# Patient Record
Sex: MALE | Race: WHITE
[De-identification: names, ages, dates, MRNs, and addresses within clinical notes are randomized per-mention and may not be internally consistent; named-entity substitution may affect disease eponyms.]

---

## 2017-09-20 ENCOUNTER — HOSPITAL ENCOUNTER (EMERGENCY)
Dept: HOSPITAL 25 - UCCORT | Age: 32
Discharge: HOME | End: 2017-09-20
Payer: COMMERCIAL

## 2017-09-20 VITALS — DIASTOLIC BLOOD PRESSURE: 90 MMHG | SYSTOLIC BLOOD PRESSURE: 147 MMHG

## 2017-09-20 DIAGNOSIS — F17.210: ICD-10-CM
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
## 2017-09-20 DIAGNOSIS — S93.401A: Primary | ICD-10-CM
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
## 2017-09-20 PROCEDURE — 99211 OFF/OP EST MAY X REQ PHY/QHP: CPT

## 2017-09-20 PROCEDURE — G0463 HOSPITAL OUTPT CLINIC VISIT: HCPCS

## 2017-09-20 NOTE — RAD
Indication: Right ankle pain.



3 views of the right ankle demonstrates soft tissue swelling laterally. Ankle mortise is

intact. No fracture is noted.



IMPRESSION: Soft tissue swelling laterally without evidence of fracture.

## 2017-09-20 NOTE — UC
Lower Extremity/Ankle HPI





- HPI Summary


HPI Summary: 





30 yo male twisted right ankle 2 days ago


swollen and ecchymotic


able to bear wt with limp





remote hx fx











- History of Current Complaint


Chief Complaint: UCLowerExtremity


Stated Complaint: TWISTED RIGHT ANKLE


Time Seen by Provider: 09/20/17 15:17


Hx Obtained From: Patient


Onset/Duration: Sudden Onset


Severity Initially: Moderate


Severity Currently: Mild


Pain Intensity: 4


Pain Scale Used: 0-10 Numeric


Aggravating Factor(s): Standing


Alleviating Factor(s): Rest, Elevation


Able to Bear Weight: Yes


Feet (Multiple View): 


  __________________________














  __________________________





 1 - tender/swollen/ecchymotic








- Allergies/Home Medications


Allergies/Adverse Reactions: 


 Allergies











Allergy/AdvReac Type Severity Reaction Status Date / Time


 


No Known Allergies Allergy   Verified 09/20/17 14:55











Home Medications: 


 Home Medications





NK [No Home Medications Reported]  09/20/17 [History Confirmed 09/20/17]











PMH/Surg Hx/FS Hx/Imm Hx


Previously Healthy: Yes





- Surgical History


Surgical History: Yes


Surgery Procedure, Year, and Place: APPENDECTOMY, Left foot surgery 3/14





- Family History


Known Family History: Positive: None


   Negative: Cardiac Disease, Hypertension, Diabetes





- Social History


Alcohol Use: Occasionally


Substance Use Type: None


Smoking Status (MU): Heavy Every Day Tobacco Smoker


Type: Cigarettes


Amount Used/How Often: 1/2 ppd


Length of Time of Smoking/Using Tobacco: 10 years


Have You Smoked in the Last Year: Yes


Household Exposure Type: Cigarettes





- Immunization History


Most Recent Influenza Vaccination: no





Review of Systems


Constitutional: Negative


Skin: Bruising


Eyes: Negative


ENT: Negative


Respiratory: Negative


Cardiovascular: Negative


Gastrointestinal: Negative


Genitourinary: Negative


Motor: Negative


Neurovascular: Negative


Musculoskeletal: Arthralgia


Neurological: Negative


Psychological: Negative


Is Patient Immunocompromised?: No


All Other Systems Reviewed And Are Negative: Yes





Physical Exam


Triage Information Reviewed: Yes


Appearance: Well-Appearing, No Pain Distress, Well-Nourished


Vital Signs: 


 Initial Vital Signs











Temp  97.9 F   09/20/17 14:52


 


Pulse  89   09/20/17 14:52


 


Resp  16   09/20/17 14:52


 


BP  147/90   09/20/17 14:52


 


Pulse Ox  98   09/20/17 14:52











Vital Signs Reviewed: Yes


Eyes: Positive: Conjunctiva Clear


ENT: Positive: Hearing grossly normal.  Negative: Nasal congestion, Nasal 

drainage, Tonsillar swelling, Tonsillar exudate, Muffled/hoarse voice


Neck: Positive: Supple, Nontender


Respiratory: Positive: Lungs clear, Normal breath sounds, No respiratory 

distress


Cardiovascular: Positive: RRR, No Murmur


Musculoskeletal: Positive: Other: - see image


Neurological: Positive: Alert


Psychological Exam: Normal


Skin Exam: Normal





Diagnostics





- Radiology


  ** No standard instances


Xray Interpretation: No Acute Changes - STS AND ROUNDED CALCIFATION AT BASE OF 

FIBULA


Radiology Interpretation Completed By: ED Physician





Lower Extremity Course/Dx





- Differential Dx/Diagnosis


Provider Diagnoses: ANKEL SPRAIN





Discharge





- Discharge Plan


Condition: Stable


Disposition: HOME


Patient Education Materials:  Ankle Sprain (ED)


Referrals: 


Vince Carr MD [Medical Doctor] - 1 Week (IF NOT BETTER)


SIVA Mccarthy [Primary Care Provider] - 2 Weeks (BP SHOULD GET RECHECKED 

SOMETIME THIS FALL)


Additional Instructions: 


ICE TWICE DAILY


ADVIL OR ALEVE IF NEEDED





RECHECK IN ONE WEEK IF NOT COMPLETELY BETTER





WEAR YOUR WALKING BOOT

## 2018-08-18 ENCOUNTER — HOSPITAL ENCOUNTER (EMERGENCY)
Dept: HOSPITAL 25 - UCCORT | Age: 33
Discharge: HOME | End: 2018-08-18
Payer: COMMERCIAL

## 2018-08-18 VITALS — SYSTOLIC BLOOD PRESSURE: 124 MMHG | DIASTOLIC BLOOD PRESSURE: 97 MMHG
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
## 2018-08-18 DIAGNOSIS — M51.37: ICD-10-CM

## 2018-08-18 DIAGNOSIS — F17.210: ICD-10-CM

## 2018-08-18 DIAGNOSIS — S39.012A: Primary | ICD-10-CM
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
## 2018-08-18 DIAGNOSIS — R03.0: ICD-10-CM

## 2018-08-18 PROCEDURE — G0463 HOSPITAL OUTPT CLINIC VISIT: HCPCS

## 2018-08-18 PROCEDURE — 96372 THER/PROPH/DIAG INJ SC/IM: CPT

## 2018-08-18 PROCEDURE — 72110 X-RAY EXAM L-2 SPINE 4/>VWS: CPT

## 2018-08-18 PROCEDURE — 99212 OFFICE O/P EST SF 10 MIN: CPT

## 2018-08-18 NOTE — RAD
HISTORY: acute lower back pain s/p heavy lifting



COMPARISONS: None



VIEWS: 5 , Frontal, lateral, coned-down lateral sacral, and bilateral oblique views of the

lumbar spine.



FINDINGS: 



ALIGNMENT:  The alignment is normal.

VERTEBRAL BODIES:  The vertebral body heights are normal. The interpedicular distances are

normal. There is minimal anterolateral marginal osteophyte formation.

JOINTS:  The facet joints are normal. 

INTERVERTEBRAL DISCS:  There is mild diffuse loss of intervertebral disc height.

SOFT TISSUE: Unremarkable.



OTHER:  The pelvis is unremarkable. The lung bases are clear.



IMPRESSION: 

MILD DEGENERATIVE DISC DISEASE.

## 2018-08-18 NOTE — UC
Back Pain HPI





- HPI Summary


HPI Summary: 





33 y/o male presents to the urgent care c/o lower back pain radiating to left 

hip and left thigh s/p lifting a tire in to his truck yesterday afternoon. Pt 

states pain is 8/10 sharp and spasmodic w/ bending sitting or laying down. Pt 

has taken Ibuprofen PO 600mg PO last night to alleviate symptoms w/o any 

improvement. Pt denies numbness or tingling sensation over the left leg, saddle 

anesthesia, urinary or fecal incontinence, urinary symptoms, fever. flank pain, 

abdominal pain, SOB, chest pain, N/V/D. No Hx of back pain.  








- History of Current Complaint


Chief Complaint: UCBackPain


Stated Complaint: LOWER BACK/BILATERAL HIP PAIN


Time Seen by Provider: 08/18/18 13:22


Hx Obtained From: Patient


Onset/Duration: Sudden Onset, Lasting Days - 1 day, Still Present, Worse Since 

- this morning


Timing: Constant


Severity Initially: Moderate


Severity Currently: Moderate


Pain Intensity: 8


Pain Scale Used: 0-10 Numeric


Back Pain: Is Discrete @ - lower back pain, Radiates To - left side of back and 

left hip


Character: Sharp, Spasmodic


Aggravating Factor(s): Lifting, Bending, Other - sitting


Alleviating Factor(s): Rest, OTC Meds - Ibuprofen PO 600mg


Associated Signs And Symptoms: Positive: Negative.  Negative: Swelling, Redness

, Bruising, Fever, Weakness, Numbness, Tingling, Abdominal Pain, Flank Pain, 

Bladder Incontinence, Bowel Incontinence, Weight Loss, Pain with Weight Bearing





- Risk Factors


AAA Risk Factors: Negative


TAD Risk Factors: Negative


Cauda Equina Risk Factors: Negative


Epidural Abscess Risk Factors: Negative





- Allergies/Home Medications


Allergies/Adverse Reactions: 


 Allergies











Allergy/AdvReac Type Severity Reaction Status Date / Time


 


No Known Allergies Allergy   Verified 08/18/18 13:12














PMH/Surg Hx/FS Hx/Imm Hx


Previously Healthy: Yes - Pt denies PMHX





- Surgical History


Surgical History: Yes


Surgery Procedure, Year, and Place: APPENDECTOMY, Left foot surgery 3/14





- Family History


Known Family History: Positive: None - Pt denies FMHX


   Negative: Cardiac Disease, Hypertension, Diabetes





- Social History


Occupation: Employed Full-time


Lives: With Family


Alcohol Use: Weekly


Substance Use Type: None


Smoking Status (MU): Heavy Every Day Tobacco Smoker


Type: Cigarettes


Amount Used/How Often: 1/2 ppd


Length of Time of Smoking/Using Tobacco: 10 years


Have You Smoked in the Last Year: Yes


Household Exposure Type: Cigarettes





- Immunization History


Most Recent Influenza Vaccination: no





Review of Systems


Constitutional: Negative


Skin: Negative


Eyes: Negative


ENT: Negative


Respiratory: Negative


Cardiovascular: Negative


Gastrointestinal: Negative


Genitourinary: Negative


Motor: Negative


Neurovascular: Negative


Musculoskeletal: Decreased ROM - lower back, Other: - lower back pain radiating 

to left hip s/p heavy lifting


Neurological: Negative


Psychological: Negative


Is Patient Immunocompromised?: No


All Other Systems Reviewed And Are Negative: Yes





Physical Exam





- Summary


Physical Exam Summary: 





Vital Signs Reviewed: Yes


Appearance: Well-Appearing, Well-Nourished, obese male standing since he states 

pain is worse by sitting w/o any apparent distress. 


Eyes: Positive: Conjunctiva Clear - PERRLA, EOMI.


ENT: Positive: Normal ENT inspection, Hearing grossly normal, Pharynx normal, 

TMs normal, Uvula midline


Neck: Positive: Supple, Nontender, No Lymphadenopathy


Respiratory: Positive: Chest non-tender, Lungs clear, Normal breath sounds, No 

respiratory distress


Cardiovascular: Positive: RRR, No Murmur, Pulses Normal, Brisk Capillary Refill


Abdomen Description: Positive: Nontender, No Organomegaly, Soft. Negative: CVA 

Tenderness (R), CVA Tenderness (L)


Bowel Sounds: Positive: Present


Musculoskeletal: Positive: Strength Intact,  BACK: Patient walked into the 

urgent care room with symmetric ambulation, No signs of limping, antalgic, able 

to bear weight. No signs of trauma, No masses palpated. Point tenderness at the 

level of L5-S1 w/ left side paraspinal muscle tenderness at the same level and 

left hip, No CVAT, no flank ecchymosis . No sacroiliac notch tenderness, No 

saddle anesthesia.ROM: limited due to pain, Straight Leg Raise: unable to 

performe sicne Pt declines laying of the bed. Patellar reflexes: brisk, 

symmetric Muscle strength lower extremities. Dorsiflexion/ plantar flexion of 

ankles. Heel/ toe walk. Lower extremities: Femoral, popliteal, posterior tibial

, and dorsalis pedis pulses WNL. Pt refuse rectal exam


Neurological: Positive: Alert, Muscle Tone Normal


Psychological Exam: Normal


Skin Exam: Normal








Triage Information Reviewed: Yes


Vital Signs: 


 Initial Vital Signs











Temp  97.6 F   08/18/18 13:13


 


Pulse  84   08/18/18 13:13


 


Resp  17   08/18/18 13:13


 


BP  124/97   08/18/18 13:13


 


Pulse Ox  98   08/18/18 13:13














Back Pain Course/Dx





- Course


Course Of Treatment: 33 y/o male presents to the urgent care c/o lower back 

pain radiating to left hip and left thigh s/p lifting a tire in to his truck 

yesterday afternoon. Pt states pain is 8/10 sharp and spasmodic w/ bending 

sitting or laying down. Pt has taken Ibuprofen PO 600mg PO last night to 

alleviate symptoms w/o any improvement. Pt denies numbness or tingling 

sensation over the left leg, saddle anesthesia, urinary or fecal incontinence, 

urinary symptoms, fever. flank pain, abdominal pain, SOB, chest pain, N/V/D. No 

Hx of back pain. Hx obtained. PE: Point tenderness at the level of the L5-S1 

and left paraspinal muscle spasm at the same level on examination.  Lumbosacral 

X-ray ordered, Impression: Mild degenerative disc disease. Toradol IM inj 

ordered at the clinic. Given by nurse. Pt tolerated well IM inj and  pain 

decrease. Pt Rx Medrol dose johanna,  Ibuprofen PO, flexeril PO to alelviate 

symptoms.  Patient was instructed to  the f/u wit orthopedic in 1 week if 

symptoms do not improve or worsen. Patient understands and agrees. Patient is 

able to ambulate freely w/o aid or limp.  Pt's BP is elevated today advised to 

decrease salt in diet, monitor BP and f/u with PCP for further management.  

Plan of care was discussed with the patient and patient understands and agrees. 

All questions were answered at patient satisfaction.  Pt left clinic 

hemodynamically stable.





- Differential Dx/Diagnosis


Differential Diagnosis/HQI/PQRI: Compressive Cord Syndrome, Fracture, Herniated 

Disc, Renal Colic, Strain, Sprain


Provider Diagnoses: 1- Acute lower back pain s/p heavy lifting.  2- Acute lower 

back strain.  3- Degenerative disc disease.  2- Elevated BP w/o Hx of HTN





Discharge





- Sign-Out/Discharge


Documenting (check all that apply): Patient Departure - D/c home





- Discharge Plan


Condition: Stable


Disposition: HOME


Prescriptions: 


Cyclobenzaprine TAB* [Flexeril 10 MG TAB*] 10 mg PO TID PRN #21 tab


 PRN Reason: Spasms - Back


Ibuprofen TAB* [Motrin TAB* 800 MG] 800 mg PO Q6H PRN #30 tab


 PRN Reason: back pain


methylPREDNISolone [Medrol Dosepak 4 MG*] 4 mg PO .SEE JOHANNA INSTRUCTION #1 johanna


Patient Education Materials:  Low Back Strain (ED), Low-Sodium Diet (ED), 

Degenerative Disc Disease (ED)


Referrals: 


[facility] [Provider Group] - 1 Week


[facility] [place] [facility] [Outside] - 1 Week


Additional Instructions: 


1- Please take Medrol dose johanna  PO as directed and also Take Ibuprofen PO q6-

8hrs prn  after meals for pain.


2- Take Flexeril PO as directed for muscle spasm. Please do not drive while 

taking the medication. 


3- Wear a back support. Avoid strenuous exercise of heavy lifting. 


4- Please follow up with Orthopedic Dr from [facility] or your PCP in 1 

week if not improvement of symptoms, for further management. 


5-Your BP is elevated today. please decrease salt in your diet, monitor BP and 

if it continues to be elevated please f/u with your PCP for further management























- Billing Disposition and Condition


Condition: STABLE


Disposition: Home